# Patient Record
Sex: FEMALE | Race: BLACK OR AFRICAN AMERICAN
[De-identification: names, ages, dates, MRNs, and addresses within clinical notes are randomized per-mention and may not be internally consistent; named-entity substitution may affect disease eponyms.]

---

## 2018-04-19 ENCOUNTER — HOSPITAL ENCOUNTER (EMERGENCY)
Dept: HOSPITAL 97 - ER | Age: 33
Discharge: HOME | End: 2018-04-19
Payer: COMMERCIAL

## 2018-04-19 VITALS — DIASTOLIC BLOOD PRESSURE: 83 MMHG | SYSTOLIC BLOOD PRESSURE: 124 MMHG | OXYGEN SATURATION: 99 % | TEMPERATURE: 98 F

## 2018-04-19 DIAGNOSIS — J02.9: Primary | ICD-10-CM

## 2018-04-19 PROCEDURE — 99283 EMERGENCY DEPT VISIT LOW MDM: CPT

## 2018-04-19 NOTE — EDPHYS
Physician Documentation                                                                           

 [facility]                                                                

Name: Vi Adams                                                                               

Age: 33 yrs                                                                                       

Sex: Female                                                                                       

: 1985                                                                                   

MRN: [id number]                                                                                   

Arrival Date: 2018                                                                          

Time: 06:58                                                                                       

Account#: [id number]                                                                            

Bed 15                                                                                            

Private MD:                                                                                       

ED Physician Justo Lei                                                                      

HPI:                                                                                              

                                                                                             

07:48 This 33 yrs old Black Female presents to ER via Ambulatory with complaints of Sore      homer 

      Throat, Ear Pain.                                                                           

07:48 The patient presents with sore throat. The patient describes throat pain as burning,    homer 

      constant. Onset: The symptoms/episode began/occurred 2 week(s) ago. Severity of             

      symptoms: At their worst the symptoms were. Modifying factors: The symptoms are             

      alleviated by nothing. Associated signs and symptoms: The patient has no apparent           

      associated signs or symptoms. The patient has not experienced similar symptoms in the       

      past.                                                                                       

                                                                                                  

OB/GYN:                                                                                           

07:24 LMP N/A - Depo-provera                                                                  jl7 

                                                                                                  

Historical:                                                                                       

- Allergies:                                                                                      

07:13 none;                                                                                   ss  

- Home Meds:                                                                                      

07:13 Depo-Provera IM [Active];                                                               ss  

- PMHx:                                                                                           

07:13 None;                                                                                   ss  

- PSHx:                                                                                           

07:13 None;                                                                                   ss  

                                                                                                  

- Immunization history:: Adult Immunizations up to date.                                          

- Social history:: Smoking status: Patient/guardian denies using tobacco.                         

- Family history:: not pertinent.                                                                 

                                                                                                  

                                                                                                  

ROS:                                                                                              

07:48 Constitutional: Negative for fever, chills, and weight loss, Eyes: Negative for injury, homer 

      pain, redness, and discharge, Neck: Negative for injury, pain, and swelling,                

      Cardiovascular: Negative for chest pain, palpitations, and edema, Respiratory: Negative     

      for shortness of breath, cough, wheezing, and pleuritic chest pain, Abdomen/GI:             

      Negative for abdominal pain, nausea, vomiting, diarrhea, and constipation, Back:            

      Negative for injury and pain, : Negative for injury, bleeding, discharge, and             

      swelling, MS/Extremity: Negative for injury and deformity, Skin: Negative for injury,       

      rash, and discoloration, Neuro: Negative for headache, weakness, numbness, tingling,        

      and seizure, Psych: Negative for depression, anxiety, suicide ideation, homicidal           

      ideation, and hallucinations, Allergy/Immunology: Negative for hives, rash, and             

      allergies, Endocrine: Negative for neck swelling, polydipsia, polyuria, polyphagia, and     

      marked weight changes, Hematologic/Lymphatic: Negative for swollen nodes, abnormal          

      bleeding, and unusual bruising.                                                             

07:48 ENT: Positive for nasal discharge, pulling at ears, sore throat.                            

                                                                                                  

Exam:                                                                                             

07:48 Constitutional:  This is a well developed, well nourished patient who is awake, alert,  homer 

      and in no acute distress. Head/Face:  Normocephalic, atraumatic. Eyes:  Pupils equal        

      round and reactive to light, extra-ocular motions intact.  Lids and lashes normal.          

      Conjunctiva and sclera are non-icteric and not injected.  Cornea within normal limits.      

      Periorbital areas with no swelling, redness, or edema. Neck:  Trachea midline, no           

      thyromegaly or masses palpated, and no cervical lymphadenopathy.  Supple, full range of     

      motion without nuchal rigidity, or vertebral point tenderness.  No Meningismus.             

      Chest/axilla:  Normal chest wall appearance and motion.  Nontender with no deformity.       

      No lesions are appreciated. Cardiovascular:  Regular rate and rhythm with a normal S1       

      and S2.  No gallops, murmurs, or rubs.  Normal PMI, no JVD.  No pulse deficits.             

      Respiratory:  Lungs have equal breath sounds bilaterally, clear to auscultation and         

      percussion.  No rales, rhonchi or wheezes noted.  No increased work of breathing, no        

      retractions or nasal flaring. Abdomen/GI:  Soft, non-tender, with normal bowel sounds.      

      No distension or tympany.  No guarding or rebound.  No evidence of tenderness               

      throughout. Back:  No spinal tenderness.  No costovertebral tenderness.  Full range of      

      motion. Skin:  Warm, dry with normal turgor.  Normal color with no rashes, no lesions,      

      and no evidence of cellulitis. MS/ Extremity:  Pulses equal, no cyanosis.                   

      Neurovascular intact.  Full, normal range of motion. Neuro:  Awake and alert, GCS 15,       

      oriented to person, place, time, and situation.  Cranial nerves II-XII grossly intact.      

      Motor strength 5/5 in all extremities.  Sensory grossly intact.  Cerebellar exam            

      normal.  Normal gait. Psych:  Awake, alert, with orientation to person, place and time.     

       Behavior, mood, and affect are within normal limits.                                       

07:48 ENT: Posterior pharynx: swelling, that is mild, erythema, that is mild.                     

                                                                                                  

Vital Signs:                                                                                      

07:13  / 83; Pulse 98; Resp 14; Temp 98.0(TE); Pulse Ox 99% on R/A; Weight 86.18 kg;    ss  

      Height 5 ft. 5 in. (165.10 cm);                                                             

07:13 Body Mass Index 31.62 (86.18 kg, 165.10 cm)                                             ss  

                                                                                                  

MDM:                                                                                              

07:16 Patient medically screened.                                                             [facility] 

19:04 Data reviewed: vital signs, nurses notes.                                               [facility] 

                                                                                                  

Administered Medications:                                                                         

08:15 Drug: Augmentin 875 mg Route: PO;                                                       [facility] 

08:20 Follow up: Response: Medication administered at discharge.                              [facility] 

08:15 Drug: Decadron 4 mg Route: PO;                                                          [facility] 

08:20 Follow up: Response: Medication administered at discharge.                              [facility] 

                                                                                                  

                                                                                                  

Disposition:                                                                                      

18 07:58 Discharged to Home. Impression: Acute pharyngitis.                                 

- Condition is Stable.                                                                            

- Discharge Instructions: Pharyngitis, Pharyngitis, Easy-to-Read, Sore Throat,                    

  Easy-to-Read.                                                                                   

- Prescriptions for Augmentin 875- 125 mg Oral Tablet - take 1 tablet by ORAL route               

  every 12 hours for 10 days; 20 tablet. Allegra- D 12 Hour  mg Oral Tablet                 

  Sustained Release 12 hr - take 1 tablet by ORAL route every 12 hours As needed; 20              

  tablet. Medrol (Go) 4 mg Oral Tablets, Dose Pack - take 1 tablet by ORAL route as              

  directed - follow package instructions; 1 packet.                                               

- Medication Reconciliation Form, Thank You Letter, Antibiotic Education, Prescription            

  Opioid Use form.                                                                                

- Follow up: Private Physician; When: 2 - 3 days; Reason: Recheck today's complaints,             

  Continuance of care, Re-evaluation by your physician.                                           

- Problem is new.                                                                                 

- Symptoms have improved.                                                                         

                                                                                                  

                                                                                                  

                                                                                                  

Signatures:                                                                                       

Justo Lei MD MD cha Smirch, Shelby, RN                      RN                                                      

Zakiya Sotelo RN                        RN   jl7                                                  

                                                                                                  

**************************************************************************************************

## 2018-04-19 NOTE — ER
Nurse's Notes                                                                                     

 [facility]                                                                

Name: Vi Adams                                                                               

Age: 33 yrs                                                                                       

Sex: Female                                                                                       

: 1985                                                                                   

MRN: [id number]                                                                                   

Arrival Date: 2018                                                                          

Time: 06:58                                                                                       

Account#: [id number]                                                                            

Bed 15                                                                                            

Private MD:                                                                                       

Diagnosis: Acute pharyngitis                                                                      

                                                                                                  

Presentation:                                                                                     

                                                                                             

07:08 Presenting complaint: Patient states: throat burning, L ear pain, nasal drainage,       ss  

      chills x 2 weeks. Pt reports her daughter was diagnosed with strep two weeks ago. Pt        

      has had a course of Amoxicillin, but reports it doesn't work and only makes her "go to      

      the restroom". Pt states, "I need a steroid shot.". Transition of care: patient was not     

      received from another setting of care. Onset of symptoms was 2018. Initial        

      Sepsis Screen: Does the patient meet any 2 criteria? No. Patient's initial sepsis           

      screen is negative. Does the patient have a suspected source of infection? No.              

      Patient's initial sepsis screen is negative. Care prior to arrival: None.                   

07:08 Method Of Arrival: Ambulatory                                                           ss  

07:08 Acuity: MEGHAN 5                                                                           ss  

07:14 Note Pt was also told to take Claritin, but didn't seem to like the idea as she rolled  ss  

      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         

                                                                                                  

OB/GYN:                                                                                           

07:24 LMP N/A - Depo-provera                                                                  jl7 

                                                                                                  

Historical:                                                                                       

- Allergies:                                                                                      

07:13 none;                                                                                   ss  

- Home Meds:                                                                                      

07:13 Depo-Provera IM [Active];                                                               ss  

- PMHx:                                                                                           

07:13 None;                                                                                   ss  

- PSHx:                                                                                           

07:13 None;                                                                                   ss  

                                                                                                  

- Immunization history:: Adult Immunizations up to date.                                          

- Social history:: Smoking status: Patient/guardian denies using tobacco.                         

- Family history:: not pertinent.                                                                 

                                                                                                  

                                                                                                  

Screenin:19 Abuse screen: Denies threats or abuse. Denies injuries from another. Nutritional        jl7 

      screening: No deficits noted. Tuberculosis screening: No symptoms or risk factors           

      identified. Fall Risk None identified.                                                      

                                                                                                  

Assessment:                                                                                       

07:19 General: Appears in no apparent distress. uncomfortable, Behavior is calm, cooperative, jl7 

      appropriate for age. Pain: Complains of pain in bilateral ears, sore throat, headache       

      Pain does not radiate. Pain currently is 7 out of 10 on a pain scale. Neuro: Level of       

      Consciousness is awake, alert, obeys commands, Oriented to person, place, time,             

      situation. Cardiovascular: Heart tones S1 S2 present Patient's skin is warm and dry.        

      Respiratory: Reports cough that is since x 2 weeks, Pt reports "Doctor gave me              

      amoxicillin but all it did was make me go to the bathroom." Airway is patent                

      Respiratory effort is even, unlabored, Respiratory pattern is regular, symmetrical,         

      Breath sounds are clear bilaterally. GI: No signs and/or symptoms were reported             

      involving the gastrointestinal system. Patient currently denies diarrhea, nausea,           

      vomiting. : No signs and/or symptoms were reported regarding the genitourinary            

      system. EENT: Ear canal clear on right ear and left ear Throat is reddened. Derm: Skin      

      is dry, Skin is normal, Skin temperature is warm. Musculoskeletal: No signs and/or          

      symptoms reported regarding the musculoskeletal system.                                     

                                                                                                  

Vital Signs:                                                                                      

07:13  / 83; Pulse 98; Resp 14; Temp 98.0(TE); Pulse Ox 99% on R/A; Weight 86.18 kg;    ss  

      Height 5 ft. 5 in. (165.10 cm);                                                             

07:13 Body Mass Index 31.62 (86.18 kg, 165.10 cm)                                               

                                                                                                  

ED Course:                                                                                        

06:58 Patient arrived in ED.                                                                  ds1 

07:12 Triage completed.                                                                       ss  

07:13 Arm band placed on right wrist.                                                           

07:16 Justo Lei MD is Attending Physician.                                             [facility] 

07:18 Zakiya Sotelo, RN is Primary Nurse.                                                      jl7 

07:19 Patient has correct armband on for positive identification. Bed in low position. Call   jl7 

      light in reach. Side rails up X 1. Pulse ox on. NIBP on.                                    

08:20 No provider procedures requiring assistance completed. Patient did not have IV access   jl7 

      during this emergency room visit.                                                           

                                                                                                  

Administered Medications:                                                                         

08:15 Drug: Augmentin 875 mg Route: PO;                                                       jl7 

08:20 Follow up: Response: Medication administered at discharge.                              jl7 

08:15 Drug: Decadron 4 mg Route: PO;                                                          jl7 

08:20 Follow up: Response: Medication administered at discharge.                              jl7 

                                                                                                  

                                                                                                  

Outcome:                                                                                          

07:58 Discharge ordered by MD.                                                                homer 

08:20 Discharged to home ambulatory.                                                          jl7 

08:20 Condition: stable                                                                           

08:20 Discharge instructions given to patient, Instructed on discharge instructions, follow       

      up and referral plans. medication usage, Demonstrated understanding of instructions,        

      follow-up care, medications, Prescriptions given X 3.                                       

08:21 Patient left the ED.                                                                    jl7 

                                                                                                  

Signatures:                                                                                       

Justo Lei MD MD cha Sanford, Demi                                ds1                                                  

Ilda Park, RN                      RN   ss                                                   

Zakiya Sotelo RN                        RN   jl7                                                  

                                                                                                  

**************************************************************************************************

## 2021-07-28 ENCOUNTER — HOSPITAL ENCOUNTER (EMERGENCY)
Dept: HOSPITAL 97 - ER | Age: 36
Discharge: HOME | End: 2021-07-28
Payer: COMMERCIAL

## 2021-07-28 DIAGNOSIS — U07.1: Primary | ICD-10-CM

## 2021-07-28 DIAGNOSIS — Z88.2: ICD-10-CM

## 2021-07-28 DIAGNOSIS — Z88.8: ICD-10-CM

## 2021-07-28 DIAGNOSIS — J10.1: ICD-10-CM

## 2021-07-28 DIAGNOSIS — Z88.1: ICD-10-CM

## 2021-07-28 DIAGNOSIS — F17.210: ICD-10-CM

## 2021-07-28 PROCEDURE — 87070 CULTURE OTHR SPECIMN AEROBIC: CPT

## 2021-07-28 PROCEDURE — 87081 CULTURE SCREEN ONLY: CPT

## 2021-07-28 PROCEDURE — 87804 INFLUENZA ASSAY W/OPTIC: CPT

## 2021-07-28 PROCEDURE — 99283 EMERGENCY DEPT VISIT LOW MDM: CPT

## 2021-07-28 NOTE — ER
Nurse's Notes                                                                                     

 [facility]                                                                 

Name: Vi Adams                                                                               

Age: 36 yrs                                                                                       

Sex: Female                                                                                       

: 1985                                                                                   

MRN: [id number]                                                                                   

Arrival Date: 2021                                                                          

Time: 08:36                                                                                       

Account#: [id number]                                                                            

Bed 30                                                                                            

Private MD:                                                                                       

Diagnosis: Influenza due to other identified influenza virus with other respiratory               

  manifestations;SARS-associated coronavirus as the cause of diseases classified                  

  elsewhere                                                                                       

                                                                                                  

Presentation:                                                                                     

                                                                                             

08:59 Chief complaint: Patient states: sore throat, ear pain and headache that began 6 days   ss  

      ago. Recent exposure to covid. Employer wants her to be tested for covid. Coronavirus       

      screen: Client denies travel out of the [place] in the last 14 days. Ebola Screen: Patient     

      denies exposure to infectious person. Patient denies travel to an Ebola-affected area       

      in the 21 days before illness onset. Initial Sepsis Screen: Does the patient meet any 2     

      criteria? No. Patient's initial sepsis screen is negative. Does the patient have a          

      suspected source of infection? No. Patient's initial sepsis screen is negative. Risk        

      Assessment: Do you want to hurt yourself or someone else? Patient reports no desire to      

      harm self or others. Onset of symptoms was 2021.                                   

08:59 Method Of Arrival: Ambulatory                                                           ss  

08:59 Acuity: MEGHAN 4                                                                           ss  

                                                                                                  

Historical:                                                                                       

- Allergies:                                                                                      

09:01 Augmentin;                                                                              ss  

09:01 steroids;                                                                               ss  

09:01 Sulfa (Sulfonamide Antibiotics);                                                        ss  

- Home Meds:                                                                                      

09:01 None [Active];                                                                          ss  

- PMHx:                                                                                           

09:01 None;                                                                                   ss  

- PSHx:                                                                                           

09:01 None;                                                                                   ss  

                                                                                                  

- Immunization history:: Adult Immunizations up to date, Client reports having NOT                

  received the Covid vaccine.                                                                     

- Social history:: Smoking status: Patient reports the use of cigarette tobacco                   

  products, denies chronic smoking, but will smoke occasionally.                                  

                                                                                                  

                                                                                                  

Screening:                                                                                        

10:10 Abuse screen: Denies threats or abuse. Nutritional screening: No deficits noted.        vg1 

      Tuberculosis screening: No symptoms or risk factors identified. Fall Risk No fall in        

      past 12 months (0 pts). No secondary diagnosis (0 pts). No IV (0 pts). Ambulatory Aid-      

      None/Bed Rest/Nurse Assist (0 pts). Gait- Normal/Bed Rest/Wheelchair (0 pts) Mental         

      Status- Oriented to own ability (0 pts). Total Romero Fall Scale indicates No Risk (0-24     

      pts).                                                                                       

                                                                                                  

Assessment:                                                                                       

10:09 General: Appears in no apparent distress. comfortable, Behavior is calm, cooperative.   vg1 

      Pain: Complains of pain in head, throat, MARY ears Pain currently is 8 out of 10 on a        

      pain scale. Pain began about a week ago. Neuro: Level of Consciousness is awake, alert,     

      obeys commands, Oriented to person, place, time, situation, Reports headache.               

      Cardiovascular: Patient's skin is warm and dry. Respiratory: Airway is patent               

      Respiratory effort is even, unlabored, Breath sounds are clear bilaterally. GI: No          

      signs and/or symptoms were reported involving the gastrointestinal system. : No signs     

      and/or symptoms were reported regarding the genitourinary system. EENT: Throat is           

      reddened. Derm: Skin is intact, is healthy with good turgor. Musculoskeletal:               

      Circulation, motion, and sensation intact.                                                  

11:30 Reassessment: Patient appears in no apparent distress at this time. No changes from     vg1 

      previously documented assessment. Patient and/or family updated on plan of care and         

      expected duration. Pain level reassessed. Patient is alert, oriented x 3, equal             

      unlabored respirations, skin warm/dry/pink.                                                 

                                                                                                  

Vital Signs:                                                                                      

08:59  / 90; Pulse 80; Resp 14; Temp 97.8(TE); Pulse Ox 100% on R/A; Weight 81.65 kg;   ss  

      Height 5 ft. 5 in. (165.10 cm); Pain 7/10;                                                  

10:28  / 86; Pulse 90; Resp 16; Pulse Ox 100% ;                                         vg1 

11:30  / 95; Pulse 88; Resp 16; Pulse Ox 99% ;                                          vg1 

08:59 Body Mass Index 29.95 (81.65 kg, 165.10 cm)                                               

                                                                                                  

ED Course:                                                                                        

08:36 Patient arrived in ED.                                                                  as  

09:01 Triage completed.                                                                         

09:01 Arm band placed on right wrist.                                                           

09:59 Hoda Brown, ALIA is Primary Nurse.                                                  1 

10:01 Justo Walton PA is PHCP.                                                                cp  

10:01 Augusto Valdes MD is Attending Physician.                                                cp  

10:10 Patient has correct armband on for positive identification. Bed in low position. Call   1 

      light in reach.                                                                             

10:28 COVID swab sent to lab. Flu and/or RSV swab sent to lab. Strep swab sent to lab.        vg1 

12:22 No provider procedures requiring assistance completed. Patient did not have IV access   vg1 

      during this emergency room visit.                                                           

                                                                                                  

Administered Medications:                                                                         

10:28 Drug: Tylenol 1000 mg Route: PO;                                                        vg1 

12:22 Follow up: Response: No adverse reaction; Pain is decreased                             vg1 

                                                                                                  

                                                                                                  

Outcome:                                                                                          

12:04 Discharge ordered by MD.                                                                clive  

12:22 Discharged to home ambulatory.                                                          vg1 

12:22 Condition: stable                                                                           

12:22 Discharge instructions given to patient, Instructed on discharge instructions, follow       

      up and referral plans. medication usage, Demonstrated understanding of instructions,        

      follow-up care, medications, Prescriptions given X 1.                                       

12:23 Patient left the ED.                                                                    vg1 

                                                                                                  

Signatures:                                                                                       

Nickie Hopkins Shelby, RN                      RN   Justo Pierre PA PA cp Garcia, Victoria, RN                    RN   vg1                                                  

                                                                                                  

**************************************************************************************************

## 2021-07-28 NOTE — EDPHYS
Physician Documentation                                                                           

 [facility]                                                                 

Name: Vi Adams                                                                               

Age: 36 yrs                                                                                       

Sex: Female                                                                                       

: 1985                                                                                   

MRN: [id number]                                                                                   

Arrival Date: 2021                                                                          

Time: 08:36                                                                                       

Account#: [id number]                                                                            

Bed 30                                                                                            

Private MD:                                                                                       

ED Physician Augusto Valdes                                                                         

HPI:                                                                                              

                                                                                             

10:20 This 36 yrs old Black Female presents to ER via Ambulatory with complaints of Headache, cp  

      Cough, Fever.                                                                               

10:20 The patient or guardian reports cough, that is intermittent, fever, headache, body      cp  

      aches.                                                                                      

10:20 Onset: The symptoms/episode began/occurred 6 day(s) ago. Severity of symptoms: in the     

      emergency department the symptoms are unchanged, despite home interventions. Associated     

      signs and symptoms: Pertinent negatives: chest pain, diarrhea, vomiting. Patient            

      reports close contact with family member who recently tested positive for COVID-19.         

                                                                                                  

Historical:                                                                                       

- Allergies:                                                                                      

09:01 Augmentin;                                                                              ss  

09:01 steroids;                                                                               ss  

09:01 Sulfa (Sulfonamide Antibiotics);                                                        ss  

- Home Meds:                                                                                      

09:01 None [Active];                                                                          ss  

- PMHx:                                                                                           

09:01 None;                                                                                   ss  

- PSHx:                                                                                           

09:01 None;                                                                                   ss  

                                                                                                  

- Immunization history:: Adult Immunizations up to date, Client reports having NOT                

  received the Covid vaccine.                                                                     

- Social history:: Smoking status: Patient reports the use of cigarette tobacco                   

  products, denies chronic smoking, but will smoke occasionally.                                  

                                                                                                  

                                                                                                  

ROS:                                                                                              

10:30 Constitutional: Positive for body aches, Negative for fever, poor PO intake.            cp  

10:30 Eyes: Negative for injury, pain, redness, and discharge.                                cp  

10:30 ENT: Positive for sore throat, Negative for drainage from ear(s), ear pain, difficulty      

      swallowing, difficulty handling secretions.                                                 

10:30 Cardiovascular: Negative for chest pain.                                                    

10:30 Respiratory: Positive for cough, with no reported sputum, Negative for shortness of         

      breath, wheezing.                                                                           

10:30 Abdomen/GI: Negative for abdominal pain, vomiting, diarrhea, constipation.                  

10:30 : Negative for urinary symptoms.                                                          

10:30 Skin: Negative for rash.                                                                    

10:30 Neuro: Positive for headache, Negative for altered mental status, dizziness, weakness.      

10:30 All other systems are negative.                                                             

                                                                                                  

Exam:                                                                                             

10:35 Constitutional: The patient appears in no acute distress, alert, awake, non-toxic, well cp  

      developed, well nourished.                                                                  

10:35 Head/Face:  Normocephalic, atraumatic.                                                  cp  

10:35 Eyes: Periorbital structures: appear normal, Conjunctiva: normal, no exudate, no            

      injection, Sclera: no appreciated abnormality, Lids and lashes: appear normal,              

      bilaterally.                                                                                

10:35 ENT: External ear(s): are unremarkable, Ear canal(s): are normal, clear, TM's: bulging,     

      is not appreciated, bilaterally, dullness, bilaterally, erythema, is not appreciated,       

      bilaterally, Nose: is normal, Mouth: Lips: moist, Oral mucosa: moist, Posterior             

      pharynx: Airway: no evidence of obstruction, patent, Tonsils: with erythema, no             

      enlargement, no exudate, erythema, that is mild, exudate, is not appreciated.               

10:35 Neck: ROM/movement: is normal, is supple, no meningismus, no nuchal rigidity, Lymph         

      nodes: no appreciated lymphadenopathy.                                                      

10:35 Chest/axilla: Inspection: normal.                                                           

10:35 Cardiovascular: Rate: normal, Rhythm: regular.                                              

10:35 Respiratory: the patient does not display signs of respiratory distress,  Respirations:     

      normal, no use of accessory muscles, no retractions, labored breathing, is not present,     

      Breath sounds: are clear throughout, no decreased breath sounds, no stridor, no             

      wheezing.                                                                                   

10:35 Abdomen/GI: Exam negative for discomfort, distension, guarding, Inspection: abdomen         

      appears normal.                                                                             

10:35 Skin: no rash present.                                                                      

                                                                                                  

Vital Signs:                                                                                      

08:59  / 90; Pulse 80; Resp 14; Temp 97.8(TE); Pulse Ox 100% on R/A; Weight 81.65 kg;   ss  

      Height 5 ft. 5 in. (165.10 cm); Pain 7/10;                                                  

10:28  / 86; Pulse 90; Resp 16; Pulse Ox 100% ;                                         vg1 

11:30  / 95; Pulse 88; Resp 16; Pulse Ox 99% ;                                          vg1 

08:59 Body Mass Index 29.95 (81.65 kg, 165.10 cm)                                             ss  

                                                                                                  

MDM:                                                                                              

10:09 Patient medically screened.                                                             cp  

11:00 Differential Diagnosis: Bronchitis Influenza Upper Respiratory Infection Sinusitis      cp  

      Otitis Media Pneumonia.                                                                     

12:04 Data reviewed: vital signs, nurses notes, lab test result(s).                           cp  

12:04 Counseling: I had a detailed discussion with the patient and/or guardian regarding: the cp  

      historical points, exam findings, and any diagnostic results supporting the                 

      discharge/admit diagnosis, lab results, to return to the emergency department if            

      symptoms worsen or persist or if there are any questions or concerns that arise at          

      home. ED course: VSS. Discussed results of labs that returned positive for influenza        

      and COVID-19. Patient appears non-toxic and no signs of respiratory distress. Will          

      discharge to home for continued monitoring.                                                 

                                                                                                  

                                                                                             

10:09 Order name: Strep; Complete Time: 10:56                                                 cp  

                                                                                             

10:09 Order name: Influenza Screen (a \T\ B); Complete Time: 10:56                              cp

                                                                                             

10:56 Interpretation: Abnormal: FLUB FLU B ----- POSITIVE for FLU B protein antigen.          cp  

                                                                                             

10:55 Order name: Throat Culture                                                              EDMS

                                                                                                  

Administered Medications:                                                                         

10: Drug: Tylenol 1000 mg Route: PO;                                                        vg1 

12:22 Follow up: Response: No adverse reaction; Pain is decreased                             vg1 

                                                                                                  

                                                                                                  

Disposition Summary:                                                                              

21 12:04                                                                                    

Discharge Ordered                                                                                 

      Location: Home                                                                          cp  

      Problem: new                                                                            cp  

      Symptoms: have improved                                                                 cp  

      Condition: Stable                                                                       cp  

      Diagnosis                                                                                   

        - Influenza due to other identified influenza virus with other respiratory            cp  

      manifestations                                                                              

        - SARS-associated coronavirus as the cause of diseases classified elsewhere           cp  

      Followup:                                                                               cp  

        - With: Private Physician                                                                  

        - When: 2 - 3 days                                                                         

        - Reason: Worsening of condition                                                           

      Discharge Instructions:                                                                     

        - Discharge Summary Sheet                                                             cp  

        - Influenza, Adult                                                                    cp  

        - COVID-19                                                                            cp  

        - COVID-19 Frequently Asked Questions                                                 cp  

        - 10 Things You Can Do to Manage Your COVID-19 Symptoms at Home - [facility]                 cp  

        - COVID-19: Quarantine vs. Isolation - [facility]                                            cp  

        - Prevent the Spread of COVID-19 if You Are Sick - CDC                                cp  

      Forms:                                                                                      

        - Medication Reconciliation Form                                                      cp  

        - Thank You Letter                                                                    cp  

        - Antibiotic Education                                                                cp  

        - Prescription Opioid Use                                                             cp  

        - Work release form                                                                   [id number] 

      Prescriptions:                                                                              

        - Tamiflu 75 mg Oral Capsule                                                               

            - take 1 tablet by ORAL route every 12 hours for 5 days; 10 tablet; Refills: 0,   cp  

      Product Selection Permitted                                                                 

Addendum:                                                                                         

2021                                                                                        

     07:01 Co-signature as Attending Physician, Augusto Valdes MD.                                    r
n

                                                                                                  

Signatures:                                                                                       

Dispatcher [employer]                           EDMS                                                 

Augusto Valdes MD MD rn Smirch, Shelby, RN RN ss Page, Corey, PA                         PA   cp                                                   

Kevin, Hoda, RN                    RN   vg1                                                  

                                                                                                  

Corrections: (The following items were deleted from the chart)                                    

                                                                                             

11:02 09:03 CORONAVIRUS+MR.LAB.BRZ ordered. EDMS                                              EDMS

                                                                                                  

**************************************************************************************************

## 2021-07-29 VITALS — OXYGEN SATURATION: 99 % | DIASTOLIC BLOOD PRESSURE: 95 MMHG | SYSTOLIC BLOOD PRESSURE: 128 MMHG

## 2021-07-29 VITALS — TEMPERATURE: 97.8 F
